# Patient Record
Sex: FEMALE | Race: WHITE
[De-identification: names, ages, dates, MRNs, and addresses within clinical notes are randomized per-mention and may not be internally consistent; named-entity substitution may affect disease eponyms.]

---

## 2018-01-17 ENCOUNTER — HOSPITAL ENCOUNTER (OUTPATIENT)
Dept: HOSPITAL 89 - US | Age: 23
End: 2018-01-17
Attending: OBSTETRICS & GYNECOLOGY
Payer: COMMERCIAL

## 2018-01-17 DIAGNOSIS — N64.4: Primary | ICD-10-CM

## 2018-01-18 NOTE — RADIOLOGY IMAGING REPORT
[place] [facility] [place] 

 

PATIENT NAME: KINJAL GUERRERO

: [id number]

MR: [id number]

V: [id number]

EXAM DATE: 

ORDERING PHYSICIAN: MIRTHA MORTON

TECHNOLOGIST: Blanche Hansen

 

PROCEDURE:US RIGHT BREAST COMPLETE

 

COMPARISON:None.

 

INDICATIONS: RIGHT BREAST PAIN FOR FOUR TO FIVE MONTHS. 

 

FINDINGS:  

The entire right breast was imaged sonographically demonstrating no 

sonographic abnormality cystic or solid, therefore, clinical followup 

recommended for patient's right breast pain. 

 

DIAGNOSTIC CATEGORY 2--BENIGN FINDING.  

 

RECOMMENDATIONS:

CLINICAL EVALUATION.   

 

IMPRESSION: 

BI-RADS 2:  Clinical followup recommended for patient's right breast 

pain.

 

 

 

Dictated by:  Cecily Alfaro M.D. on 2018 at 15:46   

Transcribed by: NANCY on 2018 at 19:16    

Approved by:  Cecily Alfaro M.D. on 2018 at 8:23   

[facility]